# Patient Record
Sex: MALE | Race: WHITE
[De-identification: names, ages, dates, MRNs, and addresses within clinical notes are randomized per-mention and may not be internally consistent; named-entity substitution may affect disease eponyms.]

---

## 2018-06-27 ENCOUNTER — HOSPITAL ENCOUNTER (EMERGENCY)
Dept: HOSPITAL 25 - UCCORT | Age: 53
Discharge: HOME | End: 2018-06-27
Payer: COMMERCIAL

## 2018-06-27 VITALS — DIASTOLIC BLOOD PRESSURE: 88 MMHG | SYSTOLIC BLOOD PRESSURE: 124 MMHG

## 2018-06-27 DIAGNOSIS — I10: ICD-10-CM

## 2018-06-27 DIAGNOSIS — L03.011: Primary | ICD-10-CM

## 2018-06-27 PROCEDURE — 99202 OFFICE O/P NEW SF 15 MIN: CPT

## 2018-06-27 PROCEDURE — G0463 HOSPITAL OUTPT CLINIC VISIT: HCPCS

## 2018-06-27 NOTE — UC
Upper Extremity HPI





- HPI Summary


HPI Summary: 





Right palmar thumb swelling, redness and tenderness this morning. No fever or 

chills. Hx of MRSA. He was doing work on a property and removing garbage and 

debris. POssible PW or injury. No trauma. No DM. 





- History of Current Complaint


Chief Complaint: UCUpperExtremity


Stated Complaint: RIGHT THUMB PAIN


Time Seen by Provider: 06/27/18 07:29


Hx Obtained From: Patient


Onset/Duration: Gradual Onset, Lasting Hours


Severity Initially: Moderate


Severity Currently: Moderate


Pain Intensity: 5


Location Of Pain: Is Discrete @ - thumb.


Character: Dull, Aching


Aggravating Factor(s): Movement


Alleviating Factor(s): Rest


Associated Signs And Symptoms: Positive: Swelling, Redness.  Negative: Fever, 

Weakness, Numbness/Tingling





- Allergies/Home Medications


Allergies/Adverse Reactions: 


 Allergies











Allergy/AdvReac Type Severity Reaction Status Date / Time


 


No Known Allergies Allergy   Verified 06/27/18 07:17











Home Medications: 


 Home Medications





Atorvastatin* [Lipitor*] 5 mg PO 1700 06/27/18 [History Confirmed 06/27/18]


Ibuprofen TAB* [Advil TAB*] 800 mg PO Q6H PRN 06/27/18 [History Confirmed 06/27/ 18]


Olmesartan/Hydrochlorothiazide [Benicar Hct 20-12.5 mg] 1 tab PO DAILY 06/27/18 

[History Confirmed 06/27/18]











PMH/Surg Hx/FS Hx/Imm Hx


Previously Healthy: No


Cardiovascular History: Hypertension





- Surgical History


Surgical History: Yes


Surgery Procedure, Year, and Place: vasectomy.  left hand repair





- Family History


Known Family History: Positive: Other - No FH of related infections.





- Social History


Lives: With Family


Alcohol Use: Rare


Substance Use Type: None


Smoking Status (MU): Never Smoked Tobacco





Review of Systems


Skin: Other - swelling.


All Other Systems Reviewed And Are Negative: Yes





Physical Exam


Triage Information Reviewed: Yes


Appearance: Well-Appearing, No Pain Distress, Well-Nourished


Vital Signs: 


 Initial Vital Signs











Temp  99.0 F   06/27/18 07:12


 


Pulse  78   06/27/18 07:12


 


Resp  14   06/27/18 07:12


 


BP  124/88   06/27/18 07:12


 


Pulse Ox  100   06/27/18 07:12











Vital Signs Reviewed: Yes


Eyes: Positive: Conjunctiva Clear


ENT: Positive: Normal ENT inspection


Neck: Positive: Supple, Nontender, No Lymphadenopathy


Respiratory: Positive: No accessory muscle use.  Negative: Respiratory distress


Cardiovascular: Positive: Brisk Capillary Refill


Abdomen Description: Negative: Distended


Musculoskeletal Exam: Other - right thumb palmar swelling and pink skin that is 

tenderness without fluctuance or crepitus. No pain with passive ROM. No 

streaking.


Neurological: Positive: Alert, Muscle Tone Normal.  Negative: Fatigued


Psychological: Positive: Age Appropriate Behavior


Skin: Negative: rashes





Upper Extremity Course/Dx





- Course


Course Of Treatment: cellulitis. Agrees to return for any worsening.





- Differential Dx/Diagnosis


Provider Diagnoses: right thumb cellulitis.





Discharge





- Sign-Out/Discharge


Documenting (check all that apply): Discharge/Admit/Transfer





- Discharge Plan


Condition: Good


Disposition: HOME


Prescriptions: 


Sulfamethox/Trimethoprim DS* [Bactrim /160 TAB*] 1 tab PO BID #20 tab


Patient Education Materials:  Cellulitis (ED)


Referrals: 


Jasper Rizzo DO [Primary Care Provider] - 2 Days





- Billing Disposition and Condition


Condition: GOOD


Disposition: Home